# Patient Record
Sex: MALE | Race: AMERICAN INDIAN OR ALASKA NATIVE
[De-identification: names, ages, dates, MRNs, and addresses within clinical notes are randomized per-mention and may not be internally consistent; named-entity substitution may affect disease eponyms.]

---

## 2017-04-27 NOTE — EMERGENCY DEPARTMENT REPORT
HPI





- General


Chief Complaint: Abdominal Pain


Time Seen by Provider: 04/27/17 18:27





- HPI


HPI: 


This is a 41-year-old Afro-American male who presents to the emergency 

department with a few days of lower abdominal pain, nausea without vomiting and 

some rectal bleeding with bowel movements.  Patient says he has a history of 

diverticulitis back in 2015 and says this feels very similar.  He was here at 

[facility] yesterday and had some blood work drawn but eloped prior to 

seeing a physician due to the wait.  He also has a history of diabetes but says 

he has not had any medication for about 8 months.  He did not take anything for 

symptoms prior to presentation.  He drove himself in to be seen today.  No 

recent travel or sick contacts at home.  He does not have a primary care doctor.








ED Past Medical Hx





- Past Medical History


Hx Diabetes: Yes (NO MEDS X 8 MONTHS)


Additional medical history: DIVERTICULITIS





- Social History


Smoking Status: Current Every Day Smoker


Substance Use Type: Alcohol





- Medications


Home Medications: 


 Home Medications











 Medication  Instructions  Recorded  Confirmed  Last Taken  Type


 


Amoxicillin/K Clav Tab [Augmentin 1 tab PO Q12HR #20 tab 04/27/17  Unknown Rx





875 mg]     


 


HYDROcodone/APAP 5-325 [[place] 1 each PO Q6HR PRN #14 tablet 04/27/17  Unknown Rx





5/325]     


 


metFORMIN [Glucophage] 500 mg PO BID #60 tablet 04/27/17  Unknown Rx














ED Review of Systems


ROS: 


Stated complaint: BLOOD IN STOOL/POSS DIVERTICULOSIS FLAIR UP


Other details as noted in HPI





Comment: All other systems reviewed and negative


Constitutional: denies: chills, fever


Eyes: denies: eye pain, eye discharge, vision change


ENT: denies: ear pain, throat pain


Respiratory: denies: cough, shortness of breath, wheezing


Cardiovascular: denies: chest pain, palpitations


Gastrointestinal: abdominal pain, nausea.  denies: vomiting


Genitourinary: denies: urgency, dysuria


Musculoskeletal: denies: back pain, joint swelling, arthralgia


Skin: denies: rash, lesions


Neurological: denies: headache, weakness, paresthesias





Physical Exam





- Physical Exam


Vital Signs: 


 Vital Signs











  04/27/17





  12:55


 


Temperature 98 F


 


Pulse Rate 90


 


Respiratory 18





Rate 


 


Blood Pressure 114/76


 


O2 Sat by Pulse 98





Oximetry 











Physical Exam: 


GENERAL: The patient is well-developed well-nourished.


HEENT: Normocephalic.  Atraumatic.  Extraocular motions are intact.  Patient 

has moist mucous membranes.  Pupils equal reactive to light bilaterally.


NECK: Supple.  Trachea is midline.


CHEST/LUNGS: Clear to auscultation.  There is no respiratory distress noted.


HEART/CARDIOVASCULAR: Regular.  There is no tachycardia.  There is no gallop 

rub or murmur.


ABDOMEN: Abdomen is soft.  Tenderness to palpation to the left lower quadrant.  

No guarding rebound tenderness.  No peritoneal signs.  Patient has normal bowel 

sounds.  There is no abdominal distention.


SKIN: Skin is warm and dry.


NEURO: The patient is awake, alert, and oriented.  The patient is cooperative.  

The patient has no focal neurologic deficits.  The patient has normal speech.


MUSCULOSKELETAL: There is no tenderness or deformity.  There is no limitation 

range of motion.  There is no evidence of acute injury.








ED Course


 Vital Signs











  04/27/17





  12:55


 


Temperature 98 F


 


Pulse Rate 90


 


Respiratory 18





Rate 


 


Blood Pressure 114/76


 


O2 Sat by Pulse 98





Oximetry 














ED Medical Decision Making





- Lab Data


Result diagrams: 


 04/27/17 19:09





 04/27/17 19:09





- Radiology Data


Radiology results: report reviewed


CT of the abdomen and pelvis with IV contrast shows mild pericolonic fat 

stranding in the left lower quadrant that is most consistent with 

diverticulitis.  There is no evidence of any perforation or abscess formation.








- Medical Decision Making


41-year-old male presents with lower abdominal pain.  He felt that this was 

consistent with a previous bout of diverticulitis.  The patient was correct as 

CT does identify some mild pericolonic fat stranding that would be consistent 

with diverticulitis.  However there is no sign of any perforation, 

microperforation or abscess formation.  Patient's labs are unremarkable 

including no leukocytosis.  Vital signs stable including being afebrile.  From 

these reasons, the patient appears stable for discharge home for outpatient 

treatment of his diverticulitis.  He was given a dose of IV antibiotics here 

and will go home on a ten-day course of Augmentin.  He has been given a refill 

prescription of his metformin.  He has been given multiple referrals for 

primary care clinics.  He knows to return to the emergency department with any 

worsening of the symptoms, development of fever, intractable abdominal pain or 

any acute distress.








- Differential Diagnosis


diverticulitis, colitis, malignancy


Critical Care Time: No


Critical care attestation.: 


If time is entered above; I have spent that time in minutes in the direct care 

of this critically ill patient, excluding procedure time.








ED Disposition


Clinical Impression: 


Diverticulitis


Qualifiers:


 Diverticulitis site: large intestine Diverticulitis bleeding: without bleeding 

Diverticulitis complication: without perforation or abscess Qualified Code(s): 

K57.32 - Diverticulitis of large intestine without perforation or abscess 

without bleeding





Disposition: DISCHARGED TO HOME OR SELFCARE


Is pt being admited?: No


Condition: Stable


Instructions:  Diverticulitis (ED), Diverticulitis Diet (ED)


Additional Instructions: 


Please follow-up with a primary care doctor in the next few days.  Return to 

the emergency department with any worsening of your abdominal pain, development 

of a fever, intractable vomiting, or any acute distress.  Take the antibiotics 

as prescribed. You've been prescribed a medication that is sedating.  Therefore 

this medication cannot be mixed with alcohol, or taken prior to driving, working

, or being responsible for children.  


Prescriptions: 


Amoxicillin/K Clav Tab [Augmentin 875 mg] 1 tab PO Q12HR #20 tab


HYDROcodone/APAP 5-325 [[place] 5/325] 1 each PO Q6HR PRN #14 tablet


 PRN Reason: Pain


metFORMIN [Glucophage] 500 mg PO BID #60 tablet


Referrals: 


PRIMARY CARE,MD [Primary Care Provider] - 3-5 Days


[facility] [Outside] - 3-5 Days


[facility] [Outside] - 3-5 Days


[facility] [Outside] - 3-5 Days


[facility] [Outside] - 3-5 Days


Time of Disposition: 22:33

## 2017-04-27 NOTE — CAT SCAN REPORT
FINAL REPORT



PROCEDURE:  CT ABDOMEN PELVIS W CON



TECHNIQUE:  Computerized axial tomography of the abdomen and

pelvis was performed after the IV injection of iodinated nonionic

contrast. 



HISTORY:  abd pain 



COMPARISON:  No prior studies are available for comparison.



FINDINGS:  

Liver, spleen, and adrenal glands are within normal limits.

Bilateral kidneys demonstrate uniform enhancement without

hydronephrosis. Aorta is of normal caliber. There is no free

fluid or free air. Gallbladder is unremarkable. Small bowel loops

are within normal limits. Multiple colonic diverticula are noted.

There is mild degree of pericolonic fat stranding at the junction

of descending colon and sigmoid colon. Moderate amount of

residual stool is noted. Appendix is normal. There is no focal

fluid collection. Urinary bladder walls appear thickened most

likely secondary to lack of distention 



IMPRESSION:  

Mild pericolonic fat stranding in the left lower quadrant is most

consistent with diverticulitis. There is no evidence of any

perforation or abscess formation at the present time

## 2017-08-10 NOTE — EMERGENCY DEPARTMENT REPORT
ED Upper Extremity Inj HPI





- General


Chief Complaint: Extremity Injury, Upper


Stated Complaint: LEFT FINGER


Time Seen by Provider: 08/10/17 21:12


Source: patient


Mode of arrival: Ambulatory


Limitations: No Limitations





- History of Present Illness


Initial Comments: 


left middle finger swelling x pain x  5 days denies trauma injury or direct blow

, works as , pain described as 5/10 aching throbbing to tip, 

there is no fever or redness , rom intact no weakness no tremor or tingling 





MD Complaint: Injury to:: finger (left middle finger )


Onset/Timin


-: days(s)


Other Extremity Injury: Fingers: Right (left middler finger )


Other Injuries: none


Place: home


Severity scale (0 -10): 5


Improves With: none


Worsens With: movement of extremity, other (palpation )


Context: other (denies trauma )


Associated Symptoms: denies: weakness, numbness





- Related Data


 Previous Rx's











 Medication  Instructions  Recorded  Last Taken  Type


 


Amoxicillin/K Clav Tab [Augmentin 1 tab PO Q12HR #20 tab 17 Unknown Rx





875 mg]    


 


HYDROcodone/APAP 5-325 [[place] 1 each PO Q6HR PRN #14 tablet 17 Unknown Rx





5/325]    


 


metFORMIN [Glucophage] 500 mg PO BID #60 tablet 17 Unknown Rx


 


Sulfamethoxazole/Trimethoprim 1 each PO BID #20 tablet 08/10/17 Unknown Rx





[Bactrim DS TAB]    


 


traMADol [Ultram 50 MG tab] 50 mg PO Q6HR PRN #15 tablet 08/10/17 Unknown Rx











 Allergies











Allergy/AdvReac Type Severity Reaction Status Date / Time


 


No Known Allergies Allergy   Unverified 17 12:22














ED Review of Systems


ROS: 


Stated complaint: LEFT FINGER


Other details as noted in HPI





Constitutional: denies: chills, fever


Eyes: denies: eye pain, eye discharge, vision change


ENT: denies: ear pain, throat pain


Respiratory: denies: cough, shortness of breath, wheezing


Cardiovascular: denies: chest pain, palpitations


Endocrine: no symptoms reported


Gastrointestinal: denies: abdominal pain, nausea, diarrhea


Genitourinary: denies: urgency, dysuria


Musculoskeletal: denies: back pain, joint swelling, arthralgia


Skin: other (left middle finger paronychia ).  denies: rash


Neurological: denies: headache, weakness, paresthesias


Psychiatric: as per HPI


Hematological/Lymphatic: denies: easy bleeding, easy bruising





ED Past Medical Hx





- Past Medical History


Hx Diabetes: Yes (NO MEDS X 8 MONTHS)


Additional medical history: DIVERTICULITIS





- Social History


Smoking Status: Current Every Day Smoker


Substance Use Type: Alcohol





- Medications


Home Medications: 


 Home Medications











 Medication  Instructions  Recorded  Confirmed  Last Taken  Type


 


Amoxicillin/K Clav Tab [Augmentin 1 tab PO Q12HR #20 tab 17  Unknown Rx





875 mg]     


 


HYDROcodone/APAP 5-325 [[place] 1 each PO Q6HR PRN #14 tablet 17  Unknown Rx





5/325]     


 


metFORMIN [Glucophage] 500 mg PO BID #60 tablet 17  Unknown Rx


 


Sulfamethoxazole/Trimethoprim 1 each PO BID #20 tablet 08/10/17  Unknown Rx





[Bactrim DS TAB]     


 


traMADol [Ultram 50 MG tab] 50 mg PO Q6HR PRN #15 tablet 08/10/17  Unknown Rx














ED Physical Exam





- General


Limitations: No Limitations


General appearance: alert, in no apparent distress





- Head


Head exam: Present: atraumatic, normocephalic





- Eye


Eye exam: Present: normal appearance





- ENT


ENT exam: Present: mucous membranes moist





- Neck


Neck exam: Present: normal inspection





- Respiratory


Respiratory exam: Present: normal lung sounds bilaterally.  Absent: respiratory 

distress





- Cardiovascular


Cardiovascular Exam: Present: regular rate, normal rhythm.  Absent: systolic 

murmur, diastolic murmur, rubs, gallop





- GI/Abdominal


GI/Abdominal exam: Present: soft, normal bowel sounds





- Rectal


Rectal exam: Present: deferred





- Extremities Exam


Extremities exam: Present: tenderness, normal capillary refill.  Absent: joint 

swelling





- Expanded Upper Extremity Exam


  ** Left


Hand Wrist exam: Present: full ROM, tenderness, swelling, other (left middle 

finger paronychia ).  Absent: abrasion, laceration, ecchymosis, deformity, 

crepidus, dislocation, erythema, amputation, nail avulsion, subungual hematoma


Neuro motor exam: Present: wrist extension intact, thumb opposition intact, 

thumb IP flexion intact, thumb adduction intact, fingers 2-5 abduction intact


Neurosensory exam: Present: 2-point discrimination, radial nerve intact, ulnar 

nerve intact, median nerve intact


Vascular: Present: normal capillary refill, radial pulse, brachial pulse, ulnar 

pulse.  Absent: vascular compromise, Pallo, pulse deficit radial art, pulse 

deficit ulnar art, pulse deficit brachial art





- Back Exam


Back exam: Present: normal inspection





- Neurological Exam


Neurological exam: Present: alert





- Psychiatric


Psychiatric exam: Present: normal affect, normal mood





- Skin


Skin exam: Present: other (left middle finger paronychia )





ED Course


 Vital Signs











  08/10/17





  19:51


 


Temperature 98.3 F


 


Pulse Rate 73


 


Respiratory 18





Rate 


 


Blood Pressure 126/80


 


O2 Sat by Pulse 98





Oximetry 














- I & D


  ** Left Dorsal Finger


Type of Procedure: Simple (paronychia)


Blade Size: 11


I & D Procedure: betadine prep


Progress: 


left middle finger paronychia finger cleaned with betadine solution , 

anesthesis awiht 1% lidocaine plain 2 cc, I&D with 11 blade scaple, moderat 

white thick pus output , nail bed blunt probed wtih steril swab, irrigated with 

10 cc ns, sterile dressing applied pt given wound care instructions pt 

verbalized understanding of same, pt tolerated procedure with minimal distress,

  








ED Medical Decision Making





- Medical Decision Making


pt is  40 y/o diabetic ,  who presents for paronychia left 

middle finger, paronychia for I&D see procedure note pt tolerated same with 

minimal distress all bleeding controlled pt given post procedure care 

instructions pt verbalized agreement and understanding with same, will dc to 

self with bactrim, tramadol pt will follow up with pcp for wound check in 2-3 

days pt verbalized understanding and agreement with discharge plan. 





Critical care attestation.: 


If time is entered above; I have spent that time in minutes in the direct care 

of this critically ill patient, excluding procedure time.








ED Disposition


Clinical Impression: 


 Paronychia of finger of left hand





Disposition: DC-01 TO HOME OR SELFCARE


Is pt being admited?: No


Does the pt Need Aspirin: No


Condition: Good


Instructions:  Paronychia (ED)


Prescriptions: 


Sulfamethoxazole/Trimethoprim [Bactrim DS TAB] 1 each PO BID #20 tablet


traMADol [Ultram 50 MG tab] 50 mg PO Q6HR PRN #15 tablet


 PRN Reason: Pain


Referrals: 


PRIMARY CARE,MD [Primary Care Provider] - 3-5 Days


Forms:  Work/School Release Form(ED)


Time of Disposition: 22:10

## 2019-04-11 NOTE — CAT SCAN REPORT
PROCEDURE:  CT HEAD/BRAIN WO CON 

 

TECHNIQUE:  Computerized tomography of the head was performed without contrast material.  

 

CT DOSE LENGTH PRODUCT:  920.5  mGycm 

 

HISTORY: headache cocaine use 

 

COMPARISONS:  None . 

 

FINDINGS: 

 

Skull and scalp:  Normal . 

Paranasal sinuses:  Normal . 

Ventricles and subarachnoid spaces:  Normal . 

Cerebrum:  No evidence of hemorrhage, acute infarction or mass . 

Cerebellum and brainstem:  No evidence of hemorrhage, acute infarction or mass . 

Vasculature:  Normal . 

Other:  None . 

ASPECTS: 10 

 

IMPRESSION:  Normal Examination . 

 

This document is electronically signed by Corbin Pickens MD., April 11 2019 10:11:41 PM ET

## 2019-04-11 NOTE — EMERGENCY DEPARTMENT REPORT
Blank Doc





- Documentation


Documentation: 





43 y o male presents to  ED stating he needs help with his cociane adddiction,


states last cocaine use was today.


denies cp,or any other sx





labs ordered


MAin side eval

## 2019-04-11 NOTE — EMERGENCY DEPARTMENT REPORT
ED General Adult HPI





- General


Chief complaint: Psych


Stated complaint: DETOX


Time Seen by Provider: 04/11/19 20:33


Source: patient, RN notes reviewed


Mode of arrival: Ambulatory


Limitations: No Limitations





- History of Present Illness


Initial comments: 





This is a 43-year-old gentleman, not known to this provider previously, presents

to the emergency room for medical clearance for detox from cocaine.  Patient 

uses "as often as I can get it."  He denies homicidality and suicidality.  He 

has a mild frontal headache which started earlier on yesterday.  The headache is

intermittent, not sudden or thunderclap in nature, and not maximal in intensity.

 He otherwise denies physical pain.  He is not expressing hallucinations.  He 

reports he is motivated to participate in detox.


-: Gradual


Location: head


Severity scale (0 -10): 0


Consistency: constant, intermittent


Improves with: none


Worsens with: none





- Related Data


                                  Previous Rx's











 Medication  Instructions  Recorded  Last Taken  Type


 


Amoxicillin/K Clav Tab [Augmentin 1 tab PO Q12HR #20 tab 04/27/17 Unknown Rx





875 mg]    


 


HYDROcodone/APAP 5-325 [[place] 1 each PO Q6HR PRN #14 tablet 04/27/17 Unknown Rx





5/325]    


 


metFORMIN [Glucophage] 500 mg PO BID #60 tablet 04/27/17 Unknown Rx


 


Sulfamethoxazole/Trimethoprim 1 each PO BID #20 tablet 08/10/17 Unknown Rx





[Bactrim DS TAB]    


 


metFORMIN [Glucophage] 500 mg PO BID #60 tablet 08/10/17 Unknown Rx


 


traMADol [Ultram 50 MG tab] 50 mg PO Q6HR PRN #15 tablet 08/10/17 Unknown Rx











                                    Allergies











Allergy/AdvReac Type Severity Reaction Status Date / Time


 


No Known Allergies Allergy   Unverified 04/26/17 12:22














ED Review of Systems


ROS: 


Stated complaint: DETOX


Other details as noted in HPI





Constitutional: denies: malaise


Eyes: denies: vision change


ENT: denies: epistaxis


Respiratory: denies: cough


Cardiovascular: denies: chest pain


Gastrointestinal: denies: abdominal pain


Genitourinary: denies: dysuria


Musculoskeletal: denies: back pain


Skin: denies: lesions


Neurological: headache


Psychiatric: denies: homicidal thoughts, suicidal thoughts





ED Past Medical Hx





- Past Medical History


Previous Medical History?: Yes


Hx Diabetes: Yes (NO MEDS X 8 MONTHS)


Additional medical history: DIVERTICULITIS





- Surgical History


Past Surgical History?: No





- Social History


Smoking Status: Current Every Day Smoker


Substance Use Type: Cocaine





- Medications


Home Medications: 


                                Home Medications











 Medication  Instructions  Recorded  Confirmed  Last Taken  Type


 


Amoxicillin/K Clav Tab [Augmentin 1 tab PO Q12HR #20 tab 04/27/17  Unknown Rx





875 mg]     


 


HYDROcodone/APAP 5-325 [[place] 1 each PO Q6HR PRN #14 tablet 04/27/17  Unknown Rx





5/325]     


 


metFORMIN [Glucophage] 500 mg PO BID #60 tablet 04/27/17  Unknown Rx


 


Sulfamethoxazole/Trimethoprim 1 each PO BID #20 tablet 08/10/17  Unknown Rx





[Bactrim DS TAB]     


 


metFORMIN [Glucophage] 500 mg PO BID #60 tablet 08/10/17  Unknown Rx


 


traMADol [Ultram 50 MG tab] 50 mg PO Q6HR PRN #15 tablet 08/10/17  Unknown Rx














ED Physical Exam





- General


Limitations: No Limitations


General appearance: alert, in no apparent distress





- Head


Head exam: Present: atraumatic, normocephalic





- Eye


Eye exam: Present: normal appearance, EOMI, other (visual acuity intact to 

finger counting, color perception, reading at a close distance).  Absent: 

nystagmus





- ENT


ENT exam: Present: normal exam, normal orophraynx, mucous membranes moist, 

normal external ear exam





- Neck


Neck exam: Present: normal inspection, full ROM.  Absent: tenderness, 

meningismus





- Respiratory


Respiratory exam: Present: normal lung sounds bilaterally.  Absent: respiratory 

distress





- Cardiovascular


Cardiovascular Exam: Present: regular rate, normal rhythm, normal heart sounds. 

Absent: bradycardia, tachycardia, irregular rhythm, systolic murmur, diastolic 

murmur, rubs, gallop





- GI/Abdominal


GI/Abdominal exam: Present: soft.  Absent: distended, tenderness, guarding, rebo

und, rigid, pulsatile mass





- Rectal


Rectal exam: Present: deferred





- Extremities Exam


Extremities exam: Present: normal inspection, full ROM, other (2+ pulses noted 

in the bilateral upper extremities.  Muscular compartments are soft.  There is 

no long bony tenderness.).  Absent: calf tenderness





- Back Exam


Back exam: Present: normal inspection, full ROM.  Absent: tenderness, CVA t

enderness (R), paraspinal tenderness, vertebral tenderness





- Neurological Exam


Neurological exam: Present: alert, oriented X3, other (Extraocular movements 

intact.  Tongue midline.  No facial droop.  Facial sensation intact to light 

touch in the V1, V2, V3 distribution bilaterally.  5 and 5 strength in 4 

extremities..  Sensation is intact to light touch in 4 extremities.).  Absent: 

motor sensory deficit





- Psychiatric


Psychiatric exam: Absent: homicidal ideation, suicidal ideation





- Skin


Skin exam: Present: warm, dry, intact, normal color.  Absent: rash





ED Course


                                   Vital Signs











  04/11/19





  20:34


 


Temperature 98 F


 


Pulse Rate 101 H


 


Respiratory 18





Rate 


 


Blood Pressure 123/87


 


O2 Sat by Pulse 100





Oximetry 














- Reevaluation(s)


Reevaluation #1: 





04/11/19 23:02


Differential diagnosis, including not limited to: Cocaine dependence, medical 

clearance for detox, intracranial lesion








Assessment and plan: 43-year-old gentleman seeking medical clearance and 

placement for cocaine dependence.  He is afebrile with reassuring vital signs 

and clinically sober and does not meet 1013 criteria at this time.  His 

tachycardia is resolved on my physical examination.  Screening laboratory 

studies so far unremarkable.  Noncontrast CT scan of brain is negative for acute

disease.  The psychiatric team has been contacted to discuss with the patient 

his options for cocaine detoxification.








Reevaluation #2: 





04/12/19 00:48


Patient given outpatient resources for cocaine detox independence by our mental 

health counselor, Mr. Bobby Gaona.  Patient appears to be comfortable and in

no acute distress, and is suitable for discharge at this point in time





ED Medical Decision Making





- Lab Data


Result diagrams: 


                                 04/11/19 20:41





                                 04/11/19 20:41








                                   Vital Signs











  04/11/19





  20:34


 


Temperature 98 F


 


Pulse Rate 101 H


 


Respiratory 18





Rate 


 


Blood Pressure 123/87


 


O2 Sat by Pulse 100





Oximetry 











                                   Lab Results











  04/11/19 04/11/19 04/11/19 Range/Units





  20:41 20:41 20:41 


 


WBC  9.2    (4.5-11.0)  K/mm3


 


RBC  4.86    (3.65-5.03)  M/mm3


 


Hgb  15.0    (11.8-15.2)  gm/dl


 


Hct  44.0    (35.5-45.6)  %


 


MCV  91    (84-94)  fl


 


MCH  31    (28-32)  pg


 


MCHC  34    (32-34)  %


 


RDW  13.5    (13.2-15.2)  %


 


Plt Count  276    (140-440)  K/mm3


 


Lymph % (Auto)  25.2    (13.4-35.0)  %


 


Mono % (Auto)  9.5 H    (0.0-7.3)  %


 


Eos % (Auto)  1.1    (0.0-4.3)  %


 


Baso % (Auto)  0.9    (0.0-1.8)  %


 


Lymph #  2.3    (1.2-5.4)  K/mm3


 


Mono #  0.9 H    (0.0-0.8)  K/mm3


 


Eos #  0.1    (0.0-0.4)  K/mm3


 


Baso #  0.1    (0.0-0.1)  K/mm3


 


Seg Neutrophils %  63.3    (40.0-70.0)  %


 


Seg Neutrophils #  5.8    (1.8-7.7)  K/mm3


 


Sodium   138   (137-145)  mmol/L


 


Potassium   4.2   (3.6-5.0)  mmol/L


 


Chloride   100.0   ()  mmol/L


 


Carbon Dioxide   25   (22-30)  mmol/L


 


Anion Gap   17   mmol/L


 


BUN   4 L   (9-20)  mg/dL


 


Creatinine   0.9   (0.8-1.5)  mg/dL


 


Estimated GFR   > 60   ml/min


 


BUN/Creatinine Ratio   4   %


 


Glucose   192 H   ()  mg/dL


 


Calcium   9.3   (8.4-10.2)  mg/dL


 


Total Bilirubin   0.60   (0.1-1.2)  mg/dL


 


AST   16   (5-40)  units/L


 


ALT   9   (7-56)  units/L


 


Alkaline Phosphatase   63   ()  units/L


 


Total Creatine Kinase     ()  units/L


 


Total Protein   7.9   (6.3-8.2)  g/dL


 


Albumin   4.6   (3.9-5)  g/dL


 


Albumin/Globulin Ratio   1.4   %


 


Urine Opiates Screen     


 


Urine Methadone Screen     


 


Ur Barbiturates Screen     


 


Phenytoin    0.9 L  (10.0-20.0)  ug/mL


 


Carbamazepine    2.0 L  (4-12)  ug/mL


 


Ur Phencyclidine Scrn     


 


Ur Amphetamines Screen     


 


U Benzodiazepines Scrn     


 


Lithium    0.1  (0.0-1.2)  mmol/L


 


Urine Cocaine Screen     


 


U Marijuana (THC) Screen     


 


Drugs of Abuse Note     


 


Plasma/Serum Alcohol     (0-0.07)  %














  04/11/19 04/11/19 04/11/19 Range/Units





  20:41 20:54 21:35 


 


WBC     (4.5-11.0)  K/mm3


 


RBC     (3.65-5.03)  M/mm3


 


Hgb     (11.8-15.2)  gm/dl


 


Hct     (35.5-45.6)  %


 


MCV     (84-94)  fl


 


MCH     (28-32)  pg


 


MCHC     (32-34)  %


 


RDW     (13.2-15.2)  %


 


Plt Count     (140-440)  K/mm3


 


Lymph % (Auto)     (13.4-35.0)  %


 


Mono % (Auto)     (0.0-7.3)  %


 


Eos % (Auto)     (0.0-4.3)  %


 


Baso % (Auto)     (0.0-1.8)  %


 


Lymph #     (1.2-5.4)  K/mm3


 


Mono #     (0.0-0.8)  K/mm3


 


Eos #     (0.0-0.4)  K/mm3


 


Baso #     (0.0-0.1)  K/mm3


 


Seg Neutrophils %     (40.0-70.0)  %


 


Seg Neutrophils #     (1.8-7.7)  K/mm3


 


Sodium     (137-145)  mmol/L


 


Potassium     (3.6-5.0)  mmol/L


 


Chloride     ()  mmol/L


 


Carbon Dioxide     (22-30)  mmol/L


 


Anion Gap     mmol/L


 


BUN     (9-20)  mg/dL


 


Creatinine     (0.8-1.5)  mg/dL


 


Estimated GFR     ml/min


 


BUN/Creatinine Ratio     %


 


Glucose     ()  mg/dL


 


Calcium     (8.4-10.2)  mg/dL


 


Total Bilirubin     (0.1-1.2)  mg/dL


 


AST     (5-40)  units/L


 


ALT     (7-56)  units/L


 


Alkaline Phosphatase     ()  units/L


 


Total Creatine Kinase    390 H  ()  units/L


 


Total Protein     (6.3-8.2)  g/dL


 


Albumin     (3.9-5)  g/dL


 


Albumin/Globulin Ratio     %


 


Urine Opiates Screen   Presumptive negative   


 


Urine Methadone Screen   Presumptive negative   


 


Ur Barbiturates Screen   Presumptive negative   


 


Phenytoin     (10.0-20.0)  ug/mL


 


Carbamazepine     (4-12)  ug/mL


 


Ur Phencyclidine Scrn   Presumptive negative   


 


Ur Amphetamines Screen   Presumptive negative   


 


U Benzodiazepines Scrn   Presumptive negative   


 


Lithium     (0.0-1.2)  mmol/L


 


Urine Cocaine Screen   Presumptive positive   


 


U Marijuana (THC) Screen   Presumptive positive   


 


Drugs of Abuse Note   Disclamer   


 


Plasma/Serum Alcohol  < 0.01    (0-0.07)  %














- Radiology Data


Radiology results: report reviewed, image reviewed





Noncontrast CT scan of the brain is negative for acute disease.


Critical care attestation.: 


If time is entered above; I have spent that time in minutes in the direct care 

of this critically ill patient, excluding procedure time.








ED Disposition


Clinical Impression: 


 History of cocaine dependence





Disposition: DC-01 TO HOME OR SELFCARE


Is pt being admited?: No


Does the pt Need Aspirin: No


Condition: Stable


Instructions:  Cocaine Abuse (ED)


Additional Instructions: 


Discontinued consumption of cocaine.  Long-term complications of cocaine 

consumption include disability, stroke, paralysis, addiction, loss of quality of

life.





Follow up with any of the listed outpatient resources for cocaine dependence





Referral Recommendations:








   1.   [facility]  


      Address: [address] [place] [id number]


      Hours: Open today  8AM 5PM


      Phone: [phone]





   2.   [facility]  


      Address: [address] [place] [id number]


      Hours: Open today  8AM 1PM


      Phone: [phone]   





   3.   [facility] 


      Hours: 24 Hours 


      Phone: [phone]





   Contact for assistance:





   * 911


   * [facility] National Suicide Prevention Lifeline


          www.suicidepreventionlifeline.org


          (390) 220-TALK (5297) 


   * [facility] National Helpline


           (856) 828-HELP (2859)


   * [facility]


          [phone]








   Reminders:





   * Take medications as ordered. Do not change the dose or time unless directed

     by your physician


   * If you experience side effects from your medications, Notify your 

     outpatient provider or PCP.


   * Ensure any weapons, lethal medications or other means of self harm are 

     secured by family/ guardian/ friend to prevent access to them.


   * Make and/ or keep all recommended appointments as scheduled.














Follow-up with her primary care doctor within the next month.  Return to the 

emergency room right away with new, worsening or different symptoms.  Patient 

may take Tylenol, alternating with ibuprofen, over-the-counter, as needed for p

ain.


Referrals: 


[facility] [place] [Primary Care Provider] - 3-5 Days


[facility] [Provider Group] - 3-5 Days

## 2019-09-13 NOTE — EMERGENCY DEPARTMENT REPORT
ED Back Pain/Injury HPI





- General


Chief Complaint: Extremity Injury, Lower


Stated Complaint: RT HIP PAIN/NAUSEA


Time Seen by Provider: 09/13/19 15:10


Source: patient


Limitations: No Limitations





- Related Data


                                  Previous Rx's











 Medication  Instructions  Recorded  Last Taken  Type


 


Cyclobenzaprine [Flexeril] 10 mg PO TID PRN #10 tablet 09/13/19 Unknown Rx


 


Ibuprofen [Motrin] 800 mg PO Q8HR PRN #20 tablet 09/13/19 Unknown Rx


 


metFORMIN [Glucophage] 500 mg PO BID #60 tablet 09/13/19 Unknown Rx


 


predniSONE [Deltasone] 20 mg PO DAILY #5 tablet 09/13/19 Unknown Rx











                                    Allergies











Allergy/AdvReac Type Severity Reaction Status Date / Time


 


No Known Allergies Allergy   Unverified 04/26/17 12:22














ED Review of Systems


ROS: 


Stated complaint: RT HIP PAIN/NAUSEA


Other details as noted in HPI





Comment: All other systems reviewed and negative





ED Past Medical Hx





- Past Medical History


DIVERTICULITIS





ED Back Pain Physical Exam





- Exam


General: 


Vital signs noted. No distress. Alert and acting appropriately.








ED Course


                                   Vital Signs











  09/13/19





  15:11


 


Temperature 98.4 F


 


Pulse Rate 80


 


Respiratory 16





Rate 


 


Blood Pressure 121/76


 


O2 Sat by Pulse 96





Oximetry 














Ed Back Pain Tests





- Tests


Tests: Normal X Rays





ED Medical Decision Making





- Radiology Data


Radiology results: report reviewed, image reviewed


Critical care attestation.: 


If time is entered above; I have spent that time in minutes in the direct care 

of this critically ill patient, excluding procedure time.








ED Disposition


Clinical Impression: 


 Sciatica, Medication refill





Disposition: DC-01 TO HOME OR SELFCARE


Is pt being admited?: No


Does the pt Need Aspirin: No


Condition: Stable


Instructions:  Lumbar Radiculopathy (ED)


Additional Instructions: 


WARM COMPRESSES AND BATHS WILL HELP





MEDS AS ORDERED TODAY





FOLLOW UP WITH DR CABALLERO IF PAIN PERSISTS


REFERRAL BELOW





FOLLOW UP  WITH PCP FOR DM


REFERRAL BELOW





Referrals: 


NORMAN CABALLERO MD [Staff Physician] - 3-5 Days


MAGDA CORDERO MD [Staff Physician] - 3-5 Days


Time of Disposition: 16:38

## 2019-09-13 NOTE — EVENT NOTE
ED Screening Note


Date of service: 09/13/19


Time: 15:11


ED Screening Note: 





This is a 43 y.o. M. that presents to the ER with right hip pain and nausea 

since last night.





This initial assessment/diagnostic orders/clinical plan/treatment(s) is/are 

subject to change based on patients health status, clinical progression and re-

assessment by fellow clinical providers in the ED. Further treatment and workup 

at subsequent clinical providers discretion. Patient/guardian urged not to elope

from the ED as their condition may be serious if not clinically assessed and 

managed. 





Initial orders include: 





XR right hip

## 2019-09-13 NOTE — XRAY REPORT
Right hip, 2 views



INDICATION:  Right hip pain for one day. 



COMPARISON: None.



IMPRESSION:  No acute osseous or soft tissue abnormality.    No significant DJD.



Signer Name: Christian Plunkett Jr, MD 

Signed: 9/13/2019 4:23 PM

 Workstation Name: [id number]

## 2019-10-05 NOTE — EMERGENCY DEPARTMENT REPORT
ED Psych HPI





- General


Chief Complaint: Psych


Stated Complaint: SI ATTEMPT


Time Seen by Provider: 10/05/19 19:56


Source: patient, EMS


Mode of arrival: Ambulatory





- History of Present Illness


Initial Comments: 


Patient is 43 years old male with history of depression, bipolar disorder.  

Patient presented to the ER complaining of depression and suicidal attempt.  

Patient presented with laceration to both wrists.  Patient stated that he is 

going through some trouble with his family.  Patient also stating that he is 

hearing voices causing him to hurt himself.  Patient denied homicidal ideation.





MD Complaint: suicidal ideation, feels depressed


-: Gradual


Associated Psychiatric Symptoms: depression, suicidal ideation, racing thoughts,

auditory hallucinations


History of same: Yes


Quality: constant


Associated Symptoms: denies other symptoms


Treatments Prior to Arrival: none


If Self Harm: admits thoughts of, has acted on plan, self-inflicted trauma





- Related Data


                                  Previous Rx's











 Medication  Instructions  Recorded  Last Taken  Type


 


Cyclobenzaprine [Flexeril] 10 mg PO TID PRN #10 tablet 09/13/19 Unknown Rx


 


Ibuprofen [Motrin] 800 mg PO Q8HR PRN #20 tablet 09/13/19 Unknown Rx


 


metFORMIN [Glucophage] 500 mg PO BID #60 tablet 09/13/19 2 Days Ago Rx





   ~10/05/19 


 


predniSONE [Deltasone] 20 mg PO DAILY #5 tablet 09/13/19 2 Days Ago Rx





   ~10/05/19 











                                    Allergies











Allergy/AdvReac Type Severity Reaction Status Date / Time


 


No Known Allergies Allergy   Unverified 04/26/17 12:22














ED Review of Systems


ROS: 


Stated complaint: SI ATTEMPT


Other details as noted in HPI





Comment: All other systems reviewed and negative


Constitutional: denies: chills, fever


Respiratory: denies: cough, orthopnea, shortness of breath, SOB with exertion


Cardiovascular: denies: chest pain


Gastrointestinal: denies: abdominal pain


Musculoskeletal: denies: back pain


Neurological: denies: headache, weakness


Psychiatric: depression, auditory hallucinations, suicidal thoughts.  denies: 

visual hallucinations, homicidal thoughts





ED Past Medical Hx





- Past Medical History


Previous Medical History?: Yes


Hx Diabetes: Yes (NO MEDS X 8 MONTHS)


Additional medical history: DIVERTICULITIS





- Social History


Smoking Status: Current Every Day Smoker


Substance Use Type: Alcohol, Cocaine





- Medications


Home Medications: 


                                Home Medications











 Medication  Instructions  Recorded  Confirmed  Last Taken  Type


 


Cyclobenzaprine [Flexeril] 10 mg PO TID PRN #10 tablet 09/13/19 10/06/19 Unknown

 Rx


 


Ibuprofen [Motrin] 800 mg PO Q8HR PRN #20 tablet 09/13/19 10/06/19 Unknown Rx


 


metFORMIN [Glucophage] 500 mg PO BID #60 tablet 09/13/19 10/07/19 2 Days Ago Rx





    ~10/05/19 


 


predniSONE [Deltasone] 20 mg PO DAILY #5 tablet 09/13/19 10/07/19 2 Days Ago Rx





    ~10/05/19 














ED Physical Exam





- General


Limitations: No Limitations


General appearance: alert, in no apparent distress, other (depressed)





- Head


Head exam: Present: atraumatic, normocephalic, normal inspection





- Eye


Eye exam: Present: normal appearance





- ENT


ENT exam: Present: normal exam, normal orophraynx, mucous membranes moist





- Neck


Neck exam: Present: normal inspection, full ROM.  Absent: tenderness, 

meningismus, lymphadenopathy, thyromegaly





- Respiratory


Respiratory exam: Present: normal lung sounds bilaterally





- Cardiovascular


Cardiovascular Exam: Present: regular rate, normal rhythm, normal heart sounds





- GI/Abdominal


GI/Abdominal exam: Present: soft, normal bowel sounds.  Absent: distended, 

tenderness, guarding, rebound, rigid, diminished bowel sounds, organomegaly, 

mass, bruit, pulsatile mass, hernia





- Extremities Exam


Extremities exam: Present: normal inspection, full ROM, normal capillary refill





- Back Exam


Back exam: Present: normal inspection, full ROM, CVA tenderness (L).  Absent: 

CVA tenderness (R), muscle spasm, paraspinal tenderness, vertebral tenderness, 

rash noted





- Neurological Exam


Neurological exam: Present: alert, oriented X3, CN II-XII intact, normal gait, 

reflexes normal





- Psychiatric


Psychiatric exam: Present: normal mood





- Skin


Skin exam: Present: warm, normal color, other (bilateral wrist laceration.  4 cm

 on the right side and 5 cm on the left side.)





ED Course


                                   Vital Signs











  10/05/19 10/06/19 10/06/19





  20:00 01:35 07:00


 


Temperature 98.1 F 98.7 F 97.6 F


 


Pulse Rate 74  70


 


Respiratory 18 18 18





Rate   


 


Blood Pressure 107/51 127/72 102/60





[Left]   


 


O2 Sat by Pulse 97 96 96





Oximetry   














  10/06/19 10/06/19 10/07/19





  13:00 19:46 02:32


 


Temperature 98.2 F 98.0 F 97.4 F L


 


Pulse Rate 74 66 79


 


Respiratory 18 18 18





Rate   


 


Blood Pressure 111/61 137/78 110/75





[Left]   


 


O2 Sat by Pulse 93 96 96





Oximetry   














  10/07/19 10/07/19 10/07/19





  08:00 16:20 21:05


 


Temperature 98.1 F 98.3 F 98.3 F


 


Pulse Rate 76 81 81


 


Respiratory 18 18 16





Rate   


 


Blood Pressure 117/80 121/78 114/71





[Left]   


 


O2 Sat by Pulse 98 99 96





Oximetry   














  10/08/19 10/08/19 10/08/19





  07:00 14:00 20:00


 


Temperature 97.7 F 98.1 F 98.4 F


 


Pulse Rate 79 89 75


 


Respiratory 18 18 16





Rate   


 


Blood Pressure 117/74 125/74 113/65





[Left]   


 


O2 Sat by Pulse 98 99 98





Oximetry   














  10/09/19 10/09/19 10/09/19





  01:12 09:29 20:00


 


Temperature 97.7 F 98.0 F 98.4 F


 


Pulse Rate 69 67 71


 


Respiratory 12 18 18





Rate   


 


Blood Pressure 119/73 112/73 126/78





[Left]   


 


O2 Sat by Pulse 94 100 99





Oximetry   














  10/10/19 10/10/19 10/10/19





  01:00 08:45 13:00


 


Temperature 97.6 F 97.8 F 98.1 F


 


Pulse Rate 67 68 84


 


Respiratory 16 18 18





Rate   


 


Blood Pressure 98/58 117/80 130/80





[Left]   


 


O2 Sat by Pulse 97 97 98





Oximetry   














- Laceration /Wound Repair


  ** Right Wrist


Wound Location: upper extremity


Wound Length (cm): 9 (4 cm on the right and 5 cm on the left)


Wound's Depth, Shape: linear


Wound Explored: clean


Wound Repaired With: Dermabond


Sterile Dressing Applied?: Yes





ED Medical Decision Making





- Lab Data


Result diagrams: 


                                 10/05/19 20:14





                                 10/05/19 20:14


Critical care attestation.: 


If time is entered above; I have spent that time in minutes in the direct care 

of this critically ill patient, excluding procedure time.








ED Disposition


Clinical Impression: 


 Suicide attempt, Laceration





Disposition: DC/TX-65 PSY HOSP/PSY UNIT


Is pt being admited?: No


Condition: Stable


Referrals: 


PRIMARY CARE,MD [Primary Care Provider] - 3-5 Days

## 2019-10-06 NOTE — CONSULTATION
History of Present Illness





- Reason for Consult


Consult date: 10/06/19


Reason for consult: Mental Health Evaluation


Requesting physician: JOVANNI BRENNER





- Chief Complaint


Chief complaint: 


"i don;t know what to do"








- History of Present Psychiatric Illness


43 y.o. AA male who presented to the ER for suicide attempt by cutting both his 

wrist. Today the patient was calm, but somewhat guarded during the assessment. 

He did state that he is "struggling" with his drug addiction. He stated that he 

used his "rent money" to get "high." He was asked several other questions about 

his mental health, he would not answer. He denies any previous suicide attempts 

when asked. He acknowledged that he wanted to "die" when he cut both his wrist 

prior to coming to the ER. He denies HI's and AVH's. He would not confirm or 

deny SI's. He denies a poor appetite, but stated that his sleep have been "off."

He denies alcohol consumption (etoh). 








Medications and Allergies


                                    Allergies











Allergy/AdvReac Type Severity Reaction Status Date / Time


 


No Known Allergies Allergy   Unverified 04/26/17 12:22











                                Home Medications











 Medication  Instructions  Recorded  Confirmed  Last Taken  Type


 


Cyclobenzaprine [Flexeril] 10 mg PO TID PRN #10 tablet 09/13/19  Unknown Rx


 


Ibuprofen [Motrin] 800 mg PO Q8HR PRN #20 tablet 09/13/19  Unknown Rx


 


metFORMIN [Glucophage] 500 mg PO BID #60 tablet 09/13/19 10/06/19 Unknown Rx


 


predniSONE [Deltasone] 20 mg PO DAILY #5 tablet 09/13/19  Unknown Rx














Past psychiatric history





- Past Medical History


Past Medical History: No medical history


Past Surgical History: No surgical history





- past Psychiatric treatment and history


psychiatric treatment history: 


Hx of substance abuse. Denies a fam psy hx. 








- Social History


Social history: lives with family





Mental Status Exam





- Vital signs


                                Last Vital Signs











Temp  97.6 F   10/06/19 07:00


 


Pulse  70   10/06/19 07:00


 


Resp  18   10/06/19 07:00


 


BP  102/60   10/06/19 07:00


 


Pulse Ox  96   10/06/19 07:00














- Exam


Narrative exam: 


MSE:





 Appearance: calm  


 Behavior: regular eye contact 


 Speech: regular rate and low tone


 Mood: guarded


 Affect: congruent to mood 


 Thought Process: circumstantial 


 Thought Content: denies HI's and AVH's 


 Motor Activity: lying in bed


 Cognition: A/O x 3


 Insight: variable


 Judgment: poor 























  















































Results


Result Diagrams: 


                                 10/05/19 20:14





                                 10/05/19 20:14


                              Abnormal lab results











  10/05/19 10/05/19 10/05/19 Range/Units





  20:14 20:14 20:14 


 


Mono % (Auto)     (0.0-7.3)  %


 


BUN    4 L  (9-20)  mg/dL


 


Glucose    158 H  ()  mg/dL


 


Salicylates  < 0.3 L    (2.8-20.0)  mg/dL


 


Acetaminophen   < 5.0 L   (10.0-30.0)  ug/mL














  10/05/19 Range/Units





  20:14 


 


Mono % (Auto)  8.2 H  (0.0-7.3)  %


 


BUN   (9-20)  mg/dL


 


Glucose   ()  mg/dL


 


Salicylates   (2.8-20.0)  mg/dL


 


Acetaminophen   (10.0-30.0)  ug/mL








All other labs normal.








Assessment and Plan


Assessment and plan: 


Impression: MDD. Cannabis Use DO. Substance Use DO (cocaine). Today the patient 

was calm, but somewhat guarded during the assessment. 





DDx: Substance Induced Mood DO





Recommendations/Plan: Continue 1013 and start Zoloft 50 mg PO daily for 

depression and Melatonin 5 mg PO HS for sleep. Discussed possible 

suicidality/medication induced zion with the patient reference Zoloft, he 

verbalized understanding.  





Dipso: The patient was referred to inpatient psy services.    





Will Staff with Dr BLU Pearl.

## 2019-10-07 NOTE — PROGRESS NOTE
Subjective





- Reason for Consult


Consult date: 10/07/19


Reason for consult: Psychiatry Follow-up





- Chief Complaint


Chief complaint: 


"I'm thinking about my kids"





43 y.o. AA male who presented to the ER for suicide attempt by cutting both his 

wrist. Today the patient was calm, but still guarded during the assessment. He 

stated that he is "only" concerned about his family at this time. He didn't want

to address his mood when asked. He did state that he feel "hopeless." He denies 

Hi's and AVH's. He would not confirm or deny SI's. No indications of side 

effects from his medication. 





Mental Status Exam





- Vital signs


                                Last Vital Signs











Temp  98.1 F   10/07/19 08:00


 


Pulse  76   10/07/19 08:00


 


Resp  18   10/07/19 08:00


 


BP  117/80   10/07/19 08:00


 


Pulse Ox  98   10/07/19 08:00














- Exam


Narrative exam: 


MSE:





 Appearance: calm  


 Behavior: poor eye contact 


 Speech: regular rate and low tone


 Mood: guarded, "hopeless"


 Affect: congruent to mood 


 Thought Process: circumstantial 


 Thought Content: denies HI's and AVH's 


 Motor Activity: lying in bed


 Cognition: A/O x 3


 Insight: variable


 Judgment: variable 























  


















































Assessment and Plan


Impression: MDD. Cannabis Use DO. Substance Use DO (cocaine). Today the patient 

was calm, but somewhat guarded during the assessment. 





DDx: Substance Induced Mood DO





Recommendations/Plan: Continue 1013, Zoloft 50 mg PO daily for depression, and 

Melatonin 5 mg PO HS for sleep. Discussed possible suicidality/medication 

induced zion with the patient reference Zoloft, he verbalized understanding.  





Dipso: The patient was referred to inpatient psy services.    





Will staff with Dr BLU Pearl.

## 2019-10-08 NOTE — PROGRESS NOTE
Subjective





- Reason for Consult


Consult date: 10/08/19


Reason for consult: Psychiatry Follow-up





- Chief Complaint


Chief complaint: 


"I need help bad"





43 y.o. AA male who presented to the ER for suicide attempt by cutting both his 

wrist. Today the patient was calm and cooperative during the assessment. He was 

more engaging. He stated that he must get help for his mental health. He stated,

"I'm no help to anyone at this point in my life." He stated that his 

conversations with his wife are going okay since his arrival to the ER. He still

want confirm or deny SI's. He denies HI;s and AVH's. He denies any side effects 

from his medication. 





Mental Status Exam





- Vital signs


                                Last Vital Signs











Temp  97.7 F   10/08/19 07:00


 


Pulse  79   10/08/19 07:00


 


Resp  18   10/08/19 07:00


 


BP  117/74   10/08/19 07:00


 


Pulse Ox  98   10/08/19 07:00














- Exam


Narrative exam: 


MSE:





 Appearance: calm, cooperative  


 Behavior: regular eye contact 


 Speech: regular rate and low tone


 Mood: "okay"


 Affect: congruent to mood 


 Thought Process: circumstantial 


 Thought Content: denies HI's and AVH's 


 Motor Activity: lying in bed


 Cognition: A/O x 3


 Insight: variable to fair 


 Judgment: variable 























  






























































Assessment and Plan


Impression: MDD. Cannabis Use DO. Substance Use DO (cocaine). Today the patient 

was calm and cooperative during the assessment. 





DDx: Substance Induced Mood DO





Recommendations/Plan: Continue 1013, Zoloft 50 mg PO daily for depression, and 

Melatonin 5 mg PO HS for sleep. Discussed possible suicidality/medication 

induced zion with the patient reference Zoloft, he verbalized understanding.  





Dipso: The patient was referred to inpatient psy services.    





Will staff with Dr BLU Pearl.

## 2019-10-09 NOTE — PROGRESS NOTE
Subjective





- Reason for Consult


Consult date: 10/09/19


Reason for consult: Psychiatric Follow-up Evaluation





- Chief Complaint


Chief complaint: 


"I feel alright"





Patient is a 43 y.o. AA male who presented to the ER for suicide attempt by 

cutting both his wrist. Today the patient is calm and cooperative during the 

assessment. He stated, "I attempted to slice my wrist. I cracked under 

pressure." He continues to engage in conversation with his wife. He reports, " 

she has been supportive." He reports that his sleep patterns are erratic. Per 

patient he has a good appetite.   He  continues to have intermittent/vague SI's.

Later, he reports AH's that are negative. For example, " the voices may say go 

get you a 50 piece and that's going to be it. It's like I'm having a 

conversation with my self. " He denies HI's, VH's, and delusions. He denies any 

side effects from his medication. 





Mental Status Exam





- Vital signs


                                Last Vital Signs











Temp  98.0 F   10/09/19 09:29


 


Pulse  67   10/09/19 09:29


 


Resp  18   10/09/19 09:29


 


BP  112/73   10/09/19 09:29


 


Pulse Ox  100   10/09/19 09:29














- Exam


Narrative exam: 


MSE:





 Appearance: calm, cooperative  


 Behavior: regular eye contact 


 Speech: regular rate and low tone


 Mood: "I'm alright"


 Affect: congruent to mood 


 Thought Process: circumstantial 


 Thought Content: denies HI's and VH's ; + SI's and AH's


 Motor Activity: ambulatory


 Cognition: A/O x 3


 Insight: variable to fair 


 Judgment: variable 











Assessment and Plan


Impression: MDD. Cannabis Use DO. Substance Use DO (cocaine). Today the patient 

is calm and cooperative during the assessment. He endorses SI's and AH's. 





DDx: Substance Induced Mood DO





Recommendations/Plan: 


1. Continue 1013. 


2. Continue Zoloft 50 mg PO daily for depression, and Melatonin 5 mg PO HS for 

sleep. Discussed possible suicidality/medication induced zion with the patient 

reference Zoloft, he verbalized understanding.  


3. Start Risperdal 1mg po QHS psychosis. Discussed metabolic side effects. 

Patient verbalizes understanding. 





Disposition: Patient has been accepted to [facility]. 

Awaiting/pending transport time.  





Will staff with Dr. BLU Pearl.

## 2019-10-10 NOTE — PROGRESS NOTE
Subjective





- Reason for Consult


Consult date: 10/10/19


Reason for consult: Psychiatry Follow-up





- Chief Complaint


Chief complaint: 


"I been thinking"





43 y.o. AA male who presented to the ER for suicide attempt by cutting both his 

wrist. Today the patient was calm during the assessment. He stated that he have 

done some thinking about his life. He stated that he hope his mental health 

admission ([facility]) will do him some good. He stated that his situation with

his wife is still 'bad." He stated, "I don't know what to do." He denies SI/HI's

and AVH's. He denies any side effects from his medication. 





Mental Status Exam





- Vital signs


                                Last Vital Signs











Temp  97.8 F   10/10/19 08:45


 


Pulse  68   10/10/19 08:45


 


Resp  18   10/10/19 08:45


 


BP  117/80   10/10/19 08:45


 


Pulse Ox  97   10/10/19 08:45














- Exam


Narrative exam: 


MSE:





 Appearance: calm, cooperative  


 Behavior: regular eye contact 


 Speech: regular rate and low tone


 Mood: "okay"


 Affect: flat  


 Thought Process: circumstantial 


 Thought Content: denies SI/HI's and AVH's 


 Motor Activity: lying in bed


 Cognition: A/O x 3


 Insight: variable to fair 


 Judgment: variable 























  






























































Assessment and Plan


Impression: MDD. Cannabis Use DO. Substance Use DO (cocaine). Today the patient 

was calm and cooperative during the assessment. 





DDx: Substance Induced Mood DO





Recommendations/Plan: Continue 1013, Zoloft 50 mg PO daily for depression, 

Melatonin 5 mg PO HS for sleep, and Risperdal 1 mg PO HS for psychosis. 

Discussed possible suicidality/medication induced zion with the patient 

reference Zoloft, he verbalized understanding. Discussed possible metabolic side

effects of Risperdal with the patient, he verbalized understanding. 





Dipso: The patient was accepted at [facility] for inpatient psy services.     





Will staff with Dr BLU Pearl.

## 2020-12-17 NOTE — EMERGENCY DEPARTMENT REPORT
Blank Doc





- Documentation


Documentation: 





44-year-old male that presents with headache with nausea and blurry vision.  D

enies any head injuries or trauma.





This initial assessment/diagnostic orders/clinical plan/treatment(s) is/are 

subject to change based on patient's health status, clinical progression and 

re-assessment by fellow clinical providers in the ED.  Further treatment and 

workup at subsequent clinical providers discretion.  Patient/guardians urged not

to elope from the ED as their condition may be serious if not clinically 

assessed and managed.  Initial orders include:


1- Patient sent to [facility] for further evaluation and treatment


2- CT head

## 2020-12-17 NOTE — CAT SCAN REPORT
. CT head/brain wo con



INDICATION / CLINICAL INFORMATION:

44 years Male; headache.



TECHNIQUE: Routine CT head without contrast. All CT scans at this location are performed using CT dos
e reduction for ALARA by means of automated exposure control.



COMPARISON: 

None.



FINDINGS:



BRAIN / INTRACRANIAL CONTENTS: No acute hemorrhage, mass effect, midline shift, hydrocephalus, or acu
te, large territorial infarct. No signs of significant atrophy or chronic infarct. No significant whi
te matter abnormality seen.



CRANIOCERVICAL JUNCTION: No significant abnormality.



ORBITS: No significant abnormality of visualized orbits.

SINUSES / MASTOIDS: No significant abnormality in the visualized paranasal sinuses or mastoid air mandie
ls.



ADDITIONAL FINDINGS: None. 



IMPRESSION:

1. No focal mass, hemorrhage, hydrocephalus, or acute, large territorial infarct. 



Signer Name: Rob Zhang MD, III 

Signed: 12/17/2020 9:31 PM

Workstation Name: [email or web address] [id number]

## 2020-12-17 NOTE — EMERGENCY DEPARTMENT REPORT
ED Headache HPI





- General


Chief Complaint: Headache


Stated Complaint: DIZZINESS/HEADACHE


Time Seen by Provider: 12/17/20 20:57





- History of Present Illness


Allergies/Adverse Reactions: 


Allergies





No Known Allergies Allergy (Unverified 04/26/17 12:22)


   








Home Medications: 


Ambulatory Orders





Cyclobenzaprine [Flexeril] 10 mg PO TID PRN #10 tablet 09/13/19 


Ibuprofen [Motrin] 800 mg PO Q8HR PRN #20 tablet 09/13/19 


metFORMIN [Glucophage] 500 mg PO BID #60 tablet 09/13/19 


predniSONE [Deltasone] 20 mg PO DAILY #5 tablet 09/13/19 


Acetaminophen [Non-Aspirin Pain Relief] 1,000 mg PO Q6H PRN #30 tablet 12/18/20 


Metoclopramide [Reglan] 10 mg PO Q6H PRN #30 tablet 12/18/20 


diphenhydrAMINE [Benadryl CAP] 25 mg PO Q6HR PRN #30 capsule 12/18/20 











ED Review of Systems


ROS: 


Stated complaint: DIZZINESS/HEADACHE


Other details as noted in HPI








ED Past Medical Hx





- Past Medical History


Previous Medical History?: Yes


Hx Diabetes: Yes (NO MEDS X 8 MONTHS)


Additional medical history: DIVERTICULITIS





- Surgical History


Past Surgical History?: No





- Social History


Smoking Status: Current Every Day Smoker


Substance Use Type: None





- Medications


Home Medications: 


                                Home Medications











 Medication  Instructions  Recorded  Confirmed  Last Taken  Type


 


Cyclobenzaprine [Flexeril] 10 mg PO TID PRN #10 tablet 09/13/19 10/06/19 Unknown

 Rx


 


Ibuprofen [Motrin] 800 mg PO Q8HR PRN #20 tablet 09/13/19 10/06/19 Unknown Rx


 


metFORMIN [Glucophage] 500 mg PO BID #60 tablet 09/13/19 10/07/19 2 Days Ago Rx





    ~10/05/19 


 


predniSONE [Deltasone] 20 mg PO DAILY #5 tablet 09/13/19 10/07/19 2 Days Ago Rx





    ~10/05/19 


 


Acetaminophen [Non-Aspirin Pain 1,000 mg PO Q6H PRN #30 tablet 12/18/20  Unknown

 Rx





Relief]     


 


Metoclopramide [Reglan] 10 mg PO Q6H PRN #30 tablet 12/18/20  Unknown Rx


 


diphenhydrAMINE [Benadryl CAP] 25 mg PO Q6HR PRN #30 capsule 12/18/20  Unknown 

Rx














ED Physical Exam





- General


Limitations: No Limitations





ED Course


                                   Vital Signs











  12/17/20





  20:52


 


Temperature 97.9 F


 


Pulse Rate 80


 


Respiratory 18





Rate 


 


Blood Pressure 134/92


 


O2 Sat by Pulse 96





Oximetry 














ED Medical Decision Making





- Radiology Data


Radiology results: report reviewed, image reviewed


Findings


Reporting MD: Rob Zhang Dictation Time: December 17, 2020 20:31 

: Not available Transcription Date:


 


. CT head/brain wo con  


 


INDICATION / CLINICAL INFORMATION:  44 years Male; headache.  


 


TECHNIQUE: Routine CT head without contrast. All CT scans at this location are 

performed using CT dose reduction for ALARA by means of automated exposure 

control.  


 


COMPARISON:  None.  


 


FINDINGS:  


 


BRAIN / INTRACRANIAL CONTENTS: No acute hemorrhage, mass effect, midline shift, 

hydrocephalus, or acute, large territorial infarct. No signs of significant 

atrophy or chronic infarct. No significant white matter abnormality seen.  


 


CRANIOCERVICAL JUNCTION: No significant abnormality.  


 


ORBITS: No significant abnormality of visualized orbits.  SINUSES / MASTOIDS: No

significant abnormality in the visualized paranasal sinuses or mastoid air 

cells.  


 


ADDITIONAL FINDINGS: None.  


 


IMPRESSION:  1. No focal mass, hemorrhage, hydrocephalus, or acute, large 

territorial infarct.  


 


Signer Name: Rob Zhang MD, III  Signed: 12/17/2020 8:31 PM  Workstation 

[email or web address]








- Medical Decision Making


This is an acute headache symptoms are resolved at this time.  CT head: no mass 

no bleed no soft tissue abnormality.  Plan DC to home with prescriptions.  

Follow-up with primary care in 2 to 3 days.  Return to emergency department 

should symptoms worsen.  Patient verbalizes agreement and understanding with 

discharge plan.  Patient DC'd to home in stable condition at this time.





Critical care attestation.: 


If time is entered above; I have spent that time in minutes in the direct care 

of this critically ill patient, excluding procedure time.








ED Disposition


Clinical Impression: 


Headache


Qualifiers:


 Headache type: unspecified Headache chronicity pattern: acute headache 

Intractability: not intractable Qualified Code(s): R51.9 - Headache, unspecified





Disposition: DC-01 TO HOME OR SELFCARE


Is pt being admited?: No


Does the pt Need Aspirin: No


Condition: Stable


Instructions:  Migraine Headache, Easy-to-Read


Prescriptions: 


diphenhydrAMINE [Benadryl CAP] 25 mg PO Q6HR PRN #30 capsule


 PRN Reason: Headache


Acetaminophen [Non-Aspirin Pain Relief] 1,000 mg PO Q6H PRN #30 tablet


 PRN Reason: Pain


Metoclopramide [Reglan] 10 mg PO Q6H PRN #30 tablet


 PRN Reason: Headache


Referrals: 


VICTOR MANUEL ADORNO MD [Staff Physician] - 3-5 Days


Forms:  Work/School Release Form(ED)


Time of Disposition: 00:50

## 2021-03-31 ENCOUNTER — HOSPITAL ENCOUNTER (EMERGENCY)
Dept: HOSPITAL 5 - ED | Age: 46
Discharge: LEFT BEFORE DISCHARGE | End: 2021-03-31
Payer: SELF-PAY

## 2021-03-31 DIAGNOSIS — R11.10: Primary | ICD-10-CM

## 2021-03-31 DIAGNOSIS — Z53.21: ICD-10-CM

## 2021-03-31 LAB
ALBUMIN SERPL-MCNC: 4.7 G/DL (ref 3.9–5)
ALT SERPL-CCNC: 10 UNITS/L (ref 7–56)
BASOPHILS # (AUTO): 0.1 K/MM3 (ref 0–0.1)
BASOPHILS NFR BLD AUTO: 0.8 % (ref 0–1.8)
BILIRUB DIRECT SERPL-MCNC: < 0.2 MG/DL (ref 0–0.2)
BILIRUB UR QL STRIP: (no result)
BLOOD UR QL VISUAL: (no result)
BUN SERPL-MCNC: 12 MG/DL (ref 9–20)
BUN/CREAT SERPL: 11 %
CALCIUM SERPL-MCNC: 9.3 MG/DL (ref 8.4–10.2)
EOSINOPHIL # BLD AUTO: 0.3 K/MM3 (ref 0–0.4)
EOSINOPHIL NFR BLD AUTO: 2.6 % (ref 0–4.3)
HCT VFR BLD CALC: 43 % (ref 35.5–45.6)
HEMOLYSIS INDEX: 13
HGB BLD-MCNC: 14.6 GM/DL (ref 11.8–15.2)
INR PPP: 0.9 (ref 0.87–1.13)
LYMPHOCYTES # BLD AUTO: 2 K/MM3 (ref 1.2–5.4)
LYMPHOCYTES NFR BLD AUTO: 17.4 % (ref 13.4–35)
MCHC RBC AUTO-ENTMCNC: 34 % (ref 32–34)
MCV RBC AUTO: 91 FL (ref 84–94)
MONOCYTES # (AUTO): 1.2 K/MM3 (ref 0–0.8)
MONOCYTES % (AUTO): 10.4 % (ref 0–7.3)
PH UR STRIP: 7 [PH] (ref 5–7)
PLATELET # BLD: 220 K/MM3 (ref 140–440)
PROT UR STRIP-MCNC: (no result) MG/DL
RBC # BLD AUTO: 4.74 M/MM3 (ref 3.65–5.03)
RBC #/AREA URNS HPF: 1 /HPF (ref 0–6)
UROBILINOGEN UR-MCNC: < 2 MG/DL (ref ?–2)
WBC #/AREA URNS HPF: < 1 /HPF (ref 0–6)

## 2021-03-31 PROCEDURE — 85610 PROTHROMBIN TIME: CPT

## 2021-03-31 PROCEDURE — 36415 COLL VENOUS BLD VENIPUNCTURE: CPT

## 2021-03-31 PROCEDURE — 81001 URINALYSIS AUTO W/SCOPE: CPT

## 2021-03-31 PROCEDURE — 80076 HEPATIC FUNCTION PANEL: CPT

## 2021-03-31 PROCEDURE — 74019 RADEX ABDOMEN 2 VIEWS: CPT

## 2021-03-31 PROCEDURE — 80048 BASIC METABOLIC PNL TOTAL CA: CPT

## 2021-03-31 PROCEDURE — 85025 COMPLETE CBC W/AUTO DIFF WBC: CPT

## 2021-03-31 NOTE — XRAY REPORT
ABDOMEN SUPINE AND ERECT



INDICATION / CLINICAL INFORMATION:

Abdominal Pain.



COMPARISON:

None available.



FINDINGS:

Bowel gas pattern is unremarkable, not indicative of obstruction. No free air or gross abnormal mass.




Signer Name: Abel Jean MD 

Signed: 3/31/2021 5:21 AM

Workstation Name: [id number]

## 2021-04-01 ENCOUNTER — HOSPITAL ENCOUNTER (EMERGENCY)
Dept: HOSPITAL 5 - ED | Age: 46
Discharge: HOME | End: 2021-04-01
Payer: SELF-PAY

## 2021-04-01 VITALS — SYSTOLIC BLOOD PRESSURE: 138 MMHG | DIASTOLIC BLOOD PRESSURE: 93 MMHG

## 2021-04-01 DIAGNOSIS — F12.10: ICD-10-CM

## 2021-04-01 DIAGNOSIS — K62.5: Primary | ICD-10-CM

## 2021-04-01 DIAGNOSIS — Z79.899: ICD-10-CM

## 2021-04-01 DIAGNOSIS — F17.200: ICD-10-CM

## 2021-04-01 DIAGNOSIS — E11.9: ICD-10-CM

## 2021-04-01 LAB
ALBUMIN SERPL-MCNC: 4.5 G/DL (ref 3.9–5)
ALT SERPL-CCNC: 9 UNITS/L (ref 7–56)
BASOPHILS # (AUTO): 0.1 K/MM3 (ref 0–0.1)
BASOPHILS NFR BLD AUTO: 0.5 % (ref 0–1.8)
BILIRUB UR QL STRIP: (no result)
BLOOD UR QL VISUAL: (no result)
BUN SERPL-MCNC: 7 MG/DL (ref 9–20)
BUN/CREAT SERPL: 8 %
CALCIUM SERPL-MCNC: 9.1 MG/DL (ref 8.4–10.2)
EOSINOPHIL # BLD AUTO: 0.4 K/MM3 (ref 0–0.4)
EOSINOPHIL NFR BLD AUTO: 4 % (ref 0–4.3)
HCT VFR BLD CALC: 41.8 % (ref 35.5–45.6)
HEMOLYSIS INDEX: 5
HGB BLD-MCNC: 14 GM/DL (ref 11.8–15.2)
LYMPHOCYTES # BLD AUTO: 2.3 K/MM3 (ref 1.2–5.4)
LYMPHOCYTES NFR BLD AUTO: 20.9 % (ref 13.4–35)
MCHC RBC AUTO-ENTMCNC: 34 % (ref 32–34)
MCV RBC AUTO: 90 FL (ref 84–94)
MONOCYTES # (AUTO): 1.1 K/MM3 (ref 0–0.8)
MONOCYTES % (AUTO): 9.7 % (ref 0–7.3)
MUCOUS THREADS #/AREA URNS HPF: (no result) /HPF
PH UR STRIP: 6 [PH] (ref 5–7)
PLATELET # BLD: 208 K/MM3 (ref 140–440)
PROT UR STRIP-MCNC: (no result) MG/DL
RBC # BLD AUTO: 4.62 M/MM3 (ref 3.65–5.03)
RBC #/AREA URNS HPF: 1 /HPF (ref 0–6)
UROBILINOGEN UR-MCNC: < 2 MG/DL (ref ?–2)
WBC #/AREA URNS HPF: 0 /HPF (ref 0–6)

## 2021-04-01 PROCEDURE — 80053 COMPREHEN METABOLIC PANEL: CPT

## 2021-04-01 PROCEDURE — 99284 EMERGENCY DEPT VISIT MOD MDM: CPT

## 2021-04-01 PROCEDURE — 96375 TX/PRO/DX INJ NEW DRUG ADDON: CPT

## 2021-04-01 PROCEDURE — 96374 THER/PROPH/DIAG INJ IV PUSH: CPT

## 2021-04-01 PROCEDURE — 85025 COMPLETE CBC W/AUTO DIFF WBC: CPT

## 2021-04-01 PROCEDURE — 82962 GLUCOSE BLOOD TEST: CPT

## 2021-04-01 PROCEDURE — 81001 URINALYSIS AUTO W/SCOPE: CPT

## 2021-04-01 PROCEDURE — 36415 COLL VENOUS BLD VENIPUNCTURE: CPT

## 2021-04-01 PROCEDURE — 83690 ASSAY OF LIPASE: CPT

## 2021-04-01 PROCEDURE — 96361 HYDRATE IV INFUSION ADD-ON: CPT

## 2021-04-01 PROCEDURE — 74177 CT ABD & PELVIS W/CONTRAST: CPT

## 2021-04-01 NOTE — EMERGENCY DEPARTMENT REPORT
HPI





- General


Chief Complaint: Abdominal Pain


Time Seen by Provider: 04/01/21 02:30





- HPI


HPI: 





This is a 45-year-old male who presents to the emergency department with 

complaint of a 2-day history of abdominal pain that is worst in the left lower 

quadrant.  The patient says "this is my diverticulitis."  He has had one episode

of bright red blood in his stool.  The patient also complains of concern for 

elevated blood sugar as he has been having increased urination and increased 

thirst.  He does have a history of non-insulin-dependent diabetes but admits to 

being out of his Metformin over the past 2 months.  The patient was here about 

24 hours ago and had abdominal x-ray but did not get seen by a physician at that

time.  He has not taken anything for his symptoms prior to presentation.  He 

does not have a primary care physician.  No recent travel or sick contacts at 

home.  Currently his abdominal pain is 7 out of 10 in intensity and sharp.  No 

known aggravating or alleviating factors.





ED Past Medical Hx





- Past Medical History


Previous Medical History?: Yes


Hx Diabetes: Yes


Additional medical history: DIVERTICULITIS





- Surgical History


Past Surgical History?: No





- Social History


Smoking Status: Current Every Day Smoker


Substance Use Type: Alcohol





- Medications


Home Medications: 


                                Home Medications











 Medication  Instructions  Recorded  Confirmed  Last Taken  Type


 


Cyclobenzaprine [Flexeril] 10 mg PO TID PRN #10 tablet 09/13/19 10/06/19 Unknown

 Rx


 


Ibuprofen [Motrin] 800 mg PO Q8HR PRN #20 tablet 09/13/19 10/06/19 Unknown Rx


 


predniSONE [Deltasone] 20 mg PO DAILY #5 tablet 09/13/19 10/07/19 2 Days Ago Rx





    ~10/05/19 


 


Acetaminophen [Non-Aspirin Pain 1,000 mg PO Q6H PRN #30 tablet 12/18/20  Unknown

 Rx





Relief]     


 


Metoclopramide [Reglan] 10 mg PO Q6H PRN #30 tablet 12/18/20  Unknown Rx


 


diphenhydrAMINE [Benadryl CAP] 25 mg PO Q6HR PRN #30 capsule 12/18/20  Unknown 

Rx


 


metFORMIN [Glucophage] 500 mg PO BID #60 tablet 04/01/21  Unknown Rx














ED Review of Systems


ROS: 


Stated complaint: BLOOD IN STOOL/EMESIS/FREQUENT URINATION


Other details as noted in HPI





Comment: All other systems reviewed and negative


Constitutional: denies: chills, fever


Eyes: denies: eye pain, vision change


ENT: denies: ear pain, throat pain


Respiratory: denies: cough, shortness of breath


Cardiovascular: denies: chest pain, palpitations


Endocrine: increased thirst, increased urine


Gastrointestinal: abdominal pain.  denies: vomiting


Genitourinary: frequency.  denies: dysuria


Musculoskeletal: denies: back pain, arthralgia


Skin: denies: rash, lesions


Neurological: denies: headache, weakness





Physical Exam





- Physical Exam


Vital Signs: 


                                   Vital Signs











  04/01/21





  00:44


 


Temperature 98.4 F


 


Pulse Rate 82


 


Respiratory 18





Rate 


 


Blood Pressure 140/81


 


O2 Sat by Pulse 99





Oximetry 











Physical Exam: 





GENERAL: The patient is well-developed well-nourished.


HENT: Normocephalic.  Atraumatic.    Patient has moist mucous membranes.


EYES: Extraocular motions are intact. 


NECK: Supple. Trachea is midline.


CHEST/LUNGS: Clear to auscultation.  There is no respiratory distress noted.


HEART/CARDIOVASCULAR: Regular.  There is no tachycardia.  There is no murmur.


ABDOMEN: Abdomen is soft.  Generalized abdominal tenderness to palpation.  No 

guarding.  Patient has normal bowel sounds.  There is no abdominal distention.


SKIN: Skin is warm and dry. 


NEURO: The patient is awake, alert, and oriented.  The patient is cooperative.  

The patient has no focal neurologic deficits.  Normal speech.


MUSCULOSKELETAL: There is no tenderness or deformity.  There is no limitation 

range of motion.  


RECTAL: Deferred.





ED Course


                                   Vital Signs











  04/01/21





  00:44


 


Temperature 98.4 F


 


Pulse Rate 82


 


Respiratory 18





Rate 


 


Blood Pressure 140/81


 


O2 Sat by Pulse 99





Oximetry 














ED Medical Decision Making





- Lab Data


Result diagrams: 


                                 04/01/21 00:45





                                 04/01/21 00:45





                                   Lab Results











  04/01/21 04/01/21 04/01/21 Range/Units





  00:45 00:45 00:59 


 


WBC  11.2 H    (4.5-11.0)  K/mm3


 


RBC  4.62    (3.65-5.03)  M/mm3


 


Hgb  14.0    (11.8-15.2)  gm/dl


 


Hct  41.8    (35.5-45.6)  %


 


MCV  90    (84-94)  fl


 


MCH  30    (28-32)  pg


 


MCHC  34    (32-34)  %


 


RDW  13.4    (13.2-15.2)  %


 


Plt Count  208    (140-440)  K/mm3


 


Lymph % (Auto)  20.9    (13.4-35.0)  %


 


Mono % (Auto)  9.7 H    (0.0-7.3)  %


 


Eos % (Auto)  4.0    (0.0-4.3)  %


 


Baso % (Auto)  0.5    (0.0-1.8)  %


 


Lymph # (Auto)  2.3    (1.2-5.4)  K/mm3


 


Mono # (Auto)  1.1 H    (0.0-0.8)  K/mm3


 


Eos # (Auto)  0.4    (0.0-0.4)  K/mm3


 


Baso # (Auto)  0.1    (0.0-0.1)  K/mm3


 


Seg Neutrophils %  64.9    (40.0-70.0)  %


 


Seg Neutrophils #  7.3    (1.8-7.7)  K/mm3


 


Sodium   136 L   (137-145)  mmol/L


 


Potassium   4.3   (3.6-5.0)  mmol/L


 


Chloride   97.4 L   ()  mmol/L


 


Carbon Dioxide   24   (22-30)  mmol/L


 


Anion Gap   19   mmol/L


 


BUN   7 L   (9-20)  mg/dL


 


Creatinine   0.9   (0.8-1.3)  mg/dL


 


Estimated GFR   > 60   ml/min


 


BUN/Creatinine Ratio   8   %


 


Glucose   423 H   ()  mg/dL


 


POC Glucose     ()  mg/dL


 


Calcium   9.1   (8.4-10.2)  mg/dL


 


Total Bilirubin   0.30   (0.1-1.2)  mg/dL


 


AST   12   (5-40)  units/L


 


ALT   9   (7-56)  units/L


 


Alkaline Phosphatase   86   ()  units/L


 


Total Protein   7.3   (6.3-8.2)  g/dL


 


Albumin   4.5   (3.9-5)  g/dL


 


Albumin/Globulin Ratio   1.6   %


 


Lipase   49   (13-60)  units/L


 


Urine Color    Straw  (Yellow)  


 


Urine Turbidity    Clear  (Clear)  


 


Urine pH    6.0  (5.0-7.0)  


 


Ur Specific Gravity    1.033 H  (1.003-1.030)  


 


Urine Protein    <15 mg/dl  (Negative)  mg/dL


 


Urine Glucose (UA)    >=500  (Negative)  mg/dL


 


Urine Ketones    Neg  (Negative)  mg/dL


 


Urine Blood    Neg  (Negative)  


 


Urine Nitrite    Neg  (Negative)  


 


Urine Bilirubin    Neg  (Negative)  


 


Urine Urobilinogen    < 2.0  (<2.0)  mg/dL


 


Ur Leukocyte Esterase    Neg  (Negative)  


 


Urine WBC (Auto)    0.0  (0.0-6.0)  /HPF


 


Urine RBC (Auto)    1.0  (0.0-6.0)  /HPF


 


Urine Mucus    Few  /HPF














  04/01/21 Range/Units





  04:52 


 


WBC   (4.5-11.0)  K/mm3


 


RBC   (3.65-5.03)  M/mm3


 


Hgb   (11.8-15.2)  gm/dl


 


Hct   (35.5-45.6)  %


 


MCV   (84-94)  fl


 


MCH   (28-32)  pg


 


MCHC   (32-34)  %


 


RDW   (13.2-15.2)  %


 


Plt Count   (140-440)  K/mm3


 


Lymph % (Auto)   (13.4-35.0)  %


 


Mono % (Auto)   (0.0-7.3)  %


 


Eos % (Auto)   (0.0-4.3)  %


 


Baso % (Auto)   (0.0-1.8)  %


 


Lymph # (Auto)   (1.2-5.4)  K/mm3


 


Mono # (Auto)   (0.0-0.8)  K/mm3


 


Eos # (Auto)   (0.0-0.4)  K/mm3


 


Baso # (Auto)   (0.0-0.1)  K/mm3


 


Seg Neutrophils %   (40.0-70.0)  %


 


Seg Neutrophils #   (1.8-7.7)  K/mm3


 


Sodium   (137-145)  mmol/L


 


Potassium   (3.6-5.0)  mmol/L


 


Chloride   ()  mmol/L


 


Carbon Dioxide   (22-30)  mmol/L


 


Anion Gap   mmol/L


 


BUN   (9-20)  mg/dL


 


Creatinine   (0.8-1.3)  mg/dL


 


Estimated GFR   ml/min


 


BUN/Creatinine Ratio   %


 


Glucose   ()  mg/dL


 


POC Glucose  143 H  ()  mg/dL


 


Calcium   (8.4-10.2)  mg/dL


 


Total Bilirubin   (0.1-1.2)  mg/dL


 


AST   (5-40)  units/L


 


ALT   (7-56)  units/L


 


Alkaline Phosphatase   ()  units/L


 


Total Protein   (6.3-8.2)  g/dL


 


Albumin   (3.9-5)  g/dL


 


Albumin/Globulin Ratio   %


 


Lipase   (13-60)  units/L


 


Urine Color   (Yellow)  


 


Urine Turbidity   (Clear)  


 


Urine pH   (5.0-7.0)  


 


Ur Specific Gravity   (1.003-1.030)  


 


Urine Protein   (Negative)  mg/dL


 


Urine Glucose (UA)   (Negative)  mg/dL


 


Urine Ketones   (Negative)  mg/dL


 


Urine Blood   (Negative)  


 


Urine Nitrite   (Negative)  


 


Urine Bilirubin   (Negative)  


 


Urine Urobilinogen   (<2.0)  mg/dL


 


Ur Leukocyte Esterase   (Negative)  


 


Urine WBC (Auto)   (0.0-6.0)  /HPF


 


Urine RBC (Auto)   (0.0-6.0)  /HPF


 


Urine Mucus   /HPF














- Radiology Data


Radiology results: report reviewed





CT abdomen pelvis w con INDICATION: Pt complains of L.L.Q. abd pain, Hx of Dive

rticulitis,GI Bleeding. TECHNIQUE: All CT scans at this location are performed 

using CT dose reduction for ALARA by means of automated exposure control. 

COMPARISON: 4/27/2017 FINDINGS: Lung bases are clear of acute disease. Liver, 

gallbladder, spleen, pancreas, kidneys and adrenals are negative. Abdominal 

aorta is normal in size. No adenopathy. Pelvis Normal appendix. Urinary bladder 

is unremarkable. Minimal sigmoid diverticulosis but no significant bowel 

abnormalities. No acute skeletal lesions. IMPRESSION: 1. No acute abnormality. 





- Medical Decision Making





This patient presents to the emergency department with complaint of abdominal 

pain that he feels is diverticulitis, as well as an episode of rectal bleeding. 

 On examination the patient does have generalized abdominal tenderness to 

palpation.  There is no guarding.  The abdomen is soft, nondistended and 

nontoxic in appearance.





Patient's labs are mostly unremarkable including CBC, CMP, lipase and 

urinalysis, except for hyperglycemia.  The patient's blood sugar is 423 but 

there is no elevation in the anion gap and therefore he does not appear to have 

diabetic ketoacidosis.  Patient was given a liter of IV fluid as well as a dose 

of IV insulin and upon reevaluation the blood sugar has come down to a much more

 reasonable level.  He was given some IV fluid and a dose of IV analgesia.  The 

patient was reevaluated multiple times over multiple hours and says that he is 

feeling greatly improved.





Vital signs have been reassuring throughout his ED course including being 

afebrile.  The patient will be discharged home to follow-up with a primary care 

physician and gastroenterology.  He will return to the emergency department with

 any worsening of his symptoms or with any acute distress.


Critical Care Time: No


Critical care attestation.: 


If time is entered above; I have spent that time in minutes in the direct care 

of this critically ill patient, excluding procedure time.








ED Disposition


Clinical Impression: 


 Rectal bleeding





Uncontrolled diabetes mellitus


Qualifiers:


 Diabetes mellitus type: type 2 Glycemic state: with hyperglycemia Qualified 

Code(s): E11.65 - Type 2 diabetes mellitus with hyperglycemia





Abdominal pain


Qualifiers:


 Abdominal location: generalized Qualified Code(s): R10.84 - Generalized abdomin

al pain





Disposition: DC-01 TO HOME OR SELFCARE


Is pt being admited?: No


Condition: Stable


Instructions:  Rectal Bleeding, Abdominal Pain, Adult, Hyperglycemia, Diabetes 

Mellitus Type 2 in Adults (ED)


Additional Instructions: 


Please follow-up with a primary care physician in the next few days.  I have 

restarting you on your Metformin.  Please try to stay away from foods that are 

high in sugar, carbohydrates and starches.  Keep a blood sugar log.





I am giving you a referral for [facility] to follow-up regarding 

your abdominal pain and rectal bleeding.





Return to the emergency department with any worsening of your symptoms, new or 

concerning symptoms not addressed during this current emergency department 

visit, or with any acute distress.


Prescriptions: 


metFORMIN [Glucophage] 500 mg PO BID #60 tablet


Referrals: 


[facility] [Provider Group] - 2-3 Days


RADHA SAVAGE MD [Staff Physician] - 3-5 Days


[facility] [Provider Group] - 3-5 Days


Time of Disposition: 05:04

## 2021-04-01 NOTE — CAT SCAN REPORT
CT abdomen pelvis w con



INDICATION:

Pt complains of L.L.Q. abd pain, Hx of Diverticulitis,GI Bleeding.



TECHNIQUE:

All CT scans at this location are performed using CT dose reduction for ALARA by means of automated e
xposure control. 



COMPARISON:

4/27/2017



FINDINGS:

Lung bases are clear of acute disease. Liver, gallbladder, spleen, pancreas, kidneys and adrenals are
 negative. Abdominal aorta is normal in size. No adenopathy.



Pelvis



Normal appendix. Urinary bladder is unremarkable. Minimal sigmoid diverticulosis but no significant b
owel abnormalities.



No acute skeletal lesions.



IMPRESSION:

1. No acute abnormality. 



Signer Name: Abel Jean MD 

Signed: 4/1/2021 4:28 AM

Workstation Name: [id number]

## 2021-04-11 ENCOUNTER — HOSPITAL ENCOUNTER (EMERGENCY)
Dept: HOSPITAL 5 - ED | Age: 46
Discharge: HOME | End: 2021-04-11
Payer: COMMERCIAL

## 2021-04-11 VITALS — SYSTOLIC BLOOD PRESSURE: 158 MMHG | DIASTOLIC BLOOD PRESSURE: 92 MMHG
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
## 2021-04-11 DIAGNOSIS — Z79.899: ICD-10-CM
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
## 2021-04-11 DIAGNOSIS — S02.832A: Primary | ICD-10-CM

## 2021-04-11 DIAGNOSIS — E11.9: ICD-10-CM

## 2021-04-11 DIAGNOSIS — S42.215A: ICD-10-CM
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
## 2021-04-11 DIAGNOSIS — F17.200: ICD-10-CM

## 2021-04-11 PROCEDURE — 70486 CT MAXILLOFACIAL W/O DYE: CPT

## 2021-04-11 PROCEDURE — 70450 CT HEAD/BRAIN W/O DYE: CPT

## 2021-04-11 PROCEDURE — 72125 CT NECK SPINE W/O DYE: CPT
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
## 2021-04-23 ENCOUNTER — HOSPITAL ENCOUNTER (EMERGENCY)
Dept: HOSPITAL 5 - ED | Age: 46
Discharge: HOME | End: 2021-04-23
Payer: COMMERCIAL

## 2021-04-23 VITALS — SYSTOLIC BLOOD PRESSURE: 159 MMHG | DIASTOLIC BLOOD PRESSURE: 103 MMHG

## 2021-04-23 DIAGNOSIS — Z79.1: ICD-10-CM

## 2021-04-23 DIAGNOSIS — L08.9: ICD-10-CM

## 2021-04-23 DIAGNOSIS — Z79.84: ICD-10-CM

## 2021-04-23 DIAGNOSIS — R03.0: ICD-10-CM

## 2021-04-23 DIAGNOSIS — Z79.899: ICD-10-CM

## 2021-04-23 DIAGNOSIS — S01.81XD: Primary | ICD-10-CM

## 2021-04-23 DIAGNOSIS — Z48.02: ICD-10-CM

## 2021-04-23 DIAGNOSIS — E11.9: ICD-10-CM
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
## 2021-04-23 DIAGNOSIS — F17.200: ICD-10-CM

## 2021-04-23 PROCEDURE — 99282 EMERGENCY DEPT VISIT SF MDM: CPT

## 2021-04-23 PROCEDURE — 87116 MYCOBACTERIA CULTURE: CPT
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
## 2021-08-01 ENCOUNTER — HOSPITAL ENCOUNTER (EMERGENCY)
Dept: HOSPITAL 5 - ED | Age: 46
Discharge: HOME | End: 2021-08-01
Payer: COMMERCIAL

## 2021-08-01 VITALS — SYSTOLIC BLOOD PRESSURE: 123 MMHG | DIASTOLIC BLOOD PRESSURE: 79 MMHG

## 2021-08-01 DIAGNOSIS — G89.29: Primary | ICD-10-CM

## 2021-08-01 DIAGNOSIS — Z76.0: ICD-10-CM

## 2021-08-01 DIAGNOSIS — F17.200: ICD-10-CM

## 2021-08-01 DIAGNOSIS — I10: ICD-10-CM

## 2021-08-01 PROCEDURE — 99282 EMERGENCY DEPT VISIT SF MDM: CPT
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
## 2022-09-08 ENCOUNTER — HOSPITAL ENCOUNTER (EMERGENCY)
Dept: HOSPITAL 5 - ED | Age: 47
Discharge: HOME | End: 2022-09-08
Payer: SELF-PAY

## 2022-09-08 VITALS — SYSTOLIC BLOOD PRESSURE: 129 MMHG | DIASTOLIC BLOOD PRESSURE: 72 MMHG

## 2022-09-08 DIAGNOSIS — J06.9: Primary | ICD-10-CM

## 2022-09-08 DIAGNOSIS — E11.9: ICD-10-CM

## 2022-09-08 DIAGNOSIS — F17.200: ICD-10-CM

## 2022-09-08 LAB
ALBUMIN SERPL-MCNC: 4 G/DL (ref 3.9–5)
ALT SERPL-CCNC: 8 UNITS/L (ref 7–56)
BUN SERPL-MCNC: 5 MG/DL (ref 9–20)
BUN/CREAT SERPL: 6 %
CALCIUM SERPL-MCNC: 8.6 MG/DL (ref 8.4–10.2)
HCT VFR BLD CALC: 39.3 % (ref 35.5–45.6)
HEMOLYSIS INDEX: 15
HGB BLD-MCNC: 13.1 GM/DL (ref 11.8–15.2)
MCHC RBC AUTO-ENTMCNC: 33 % (ref 32–34)
MCV RBC AUTO: 90 FL (ref 84–94)
PLATELET # BLD: 184 K/MM3 (ref 140–440)
RBC # BLD AUTO: 4.38 M/MM3 (ref 3.65–5.03)

## 2022-09-08 PROCEDURE — 99283 EMERGENCY DEPT VISIT LOW MDM: CPT

## 2022-09-08 PROCEDURE — 71046 X-RAY EXAM CHEST 2 VIEWS: CPT

## 2022-09-08 PROCEDURE — 85027 COMPLETE CBC AUTOMATED: CPT

## 2022-09-08 PROCEDURE — 80053 COMPREHEN METABOLIC PANEL: CPT

## 2022-09-08 PROCEDURE — 36415 COLL VENOUS BLD VENIPUNCTURE: CPT

## 2022-09-08 NOTE — XRAY REPORT
CHEST 2 VIEWS 



INDICATION / CLINICAL INFORMATION: Cough.



COMPARISON: None available.



FINDINGS:



SUPPORT DEVICES: None.

HEART / MEDIASTINUM: The heart size and pulmonary vasculature are normal. 

LUNGS / PLEURA: No significant pulmonary or pleural abnormality. No pneumothorax. 



ADDITIONAL FINDINGS: No significant additional findings.



IMPRESSION: No acute findings.



Signer Name: Kaushik Vasquez MD 

Signed: 9/8/2022 2:06 PM

Workstation Name: [id number]
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
## 2022-09-21 ENCOUNTER — HOSPITAL ENCOUNTER (EMERGENCY)
Dept: HOSPITAL 5 - ED | Age: 47
Discharge: HOME | End: 2022-09-21
Payer: SELF-PAY

## 2022-09-21 VITALS — SYSTOLIC BLOOD PRESSURE: 133 MMHG | DIASTOLIC BLOOD PRESSURE: 52 MMHG

## 2022-09-21 DIAGNOSIS — B96.89: ICD-10-CM

## 2022-09-21 DIAGNOSIS — E11.9: ICD-10-CM

## 2022-09-21 DIAGNOSIS — Z79.899: ICD-10-CM

## 2022-09-21 DIAGNOSIS — Z72.89: ICD-10-CM

## 2022-09-21 DIAGNOSIS — F17.200: ICD-10-CM

## 2022-09-21 DIAGNOSIS — J01.90: Primary | ICD-10-CM

## 2022-09-21 PROCEDURE — 99282 EMERGENCY DEPT VISIT SF MDM: CPT
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
## 2023-09-26 ENCOUNTER — WEB ENCOUNTER (OUTPATIENT)
Dept: URBAN - METROPOLITAN AREA CLINIC 92 | Facility: CLINIC | Age: 48
End: 2023-09-26

## 2023-09-26 ENCOUNTER — CLAIMS CREATED FROM THE CLAIM WINDOW (OUTPATIENT)
Dept: URBAN - METROPOLITAN AREA CLINIC 92 | Facility: CLINIC | Age: 48
End: 2023-09-26
Payer: SELF-PAY

## 2023-09-26 VITALS
HEART RATE: 67 BPM | DIASTOLIC BLOOD PRESSURE: 71 MMHG | BODY MASS INDEX: 32.21 KG/M2 | TEMPERATURE: 97.2 F | HEIGHT: 72 IN | WEIGHT: 237.8 LBS | SYSTOLIC BLOOD PRESSURE: 119 MMHG

## 2023-09-26 DIAGNOSIS — Z12.11 COLON CANCER SCREENING: ICD-10-CM

## 2023-09-26 DIAGNOSIS — Z87.19 HISTORY OF DIVERTICULITIS: ICD-10-CM

## 2023-09-26 PROCEDURE — 99202 OFFICE O/P NEW SF 15 MIN: CPT

## 2023-09-26 PROCEDURE — 99242 OFF/OP CONSLTJ NEW/EST SF 20: CPT

## 2023-09-26 RX ORDER — METFORMIN HYDROCHLORIDE 1000 MG/1
1 TABLET WITH A MEAL TABLET, FILM COATED ORAL ONCE A DAY
Status: ACTIVE | COMMUNITY

## 2023-09-26 RX ORDER — SODIUM, POTASSIUM,MAG SULFATES 17.5-3.13G
177ML SOLUTION, RECONSTITUTED, ORAL ORAL
Qty: 1 KIT | Refills: 0 | OUTPATIENT
Start: 2023-09-26 | End: 2023-09-27

## 2023-09-26 NOTE — HPI-TODAY'S VISIT:
This patient was referred by Dr. Crystal PURVIS for an evaluation of colon cancer screening.  A copy of this will be sent to the referring provider. Patient has a history of T2DM, last A1C 8. Never had a colonoscopy. There is no family history of colon polyps or colon cancer. Patient denies a change in bowel habits, appetite, and weight. Patient denies abdominal pain, constipation, diarrhea, hematochezia, or melena. Denies upper GI issues. Denies NSAID use. Patient denies any cardiac, lung, or kidney issues. No pacemaker, No blood thinner, No home O2.    Patient reports he has had 4-5 episodes of diverticulitis since 2014. Last episode occurred at the beginning of September and was treated with cipro and flagyl, finished abx course two weeks ago and is feeling well. Did have hematochezia and abdominal pain with this recent episode. Last bout of diverticultitis was in 2018.

## 2023-10-16 ENCOUNTER — OFFICE VISIT (OUTPATIENT)
Dept: URBAN - METROPOLITAN AREA SURGERY CENTER 16 | Facility: SURGERY CENTER | Age: 48
End: 2023-10-16

## 2023-11-03 ENCOUNTER — DASHBOARD ENCOUNTERS (OUTPATIENT)
Age: 48
End: 2023-11-03

## 2023-11-06 ENCOUNTER — OFFICE VISIT (OUTPATIENT)
Dept: URBAN - METROPOLITAN AREA CLINIC 92 | Facility: CLINIC | Age: 48
End: 2023-11-06

## 2023-11-06 RX ORDER — METFORMIN HYDROCHLORIDE 1000 MG/1
1 TABLET WITH A MEAL TABLET, FILM COATED ORAL ONCE A DAY
COMMUNITY